# Patient Record
Sex: MALE | Race: WHITE
[De-identification: names, ages, dates, MRNs, and addresses within clinical notes are randomized per-mention and may not be internally consistent; named-entity substitution may affect disease eponyms.]

---
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
## 2021-02-23 ENCOUNTER — HOSPITAL ENCOUNTER (EMERGENCY)
Dept: HOSPITAL 53 - M ED | Age: 21
Discharge: HOME | End: 2021-02-23
Payer: COMMERCIAL

## 2021-02-23 VITALS — DIASTOLIC BLOOD PRESSURE: 65 MMHG | SYSTOLIC BLOOD PRESSURE: 138 MMHG

## 2021-02-23 VITALS — WEIGHT: 169.76 LBS | BODY MASS INDEX: 21.79 KG/M2 | HEIGHT: 74 IN

## 2021-02-23 DIAGNOSIS — F17.200: ICD-10-CM

## 2021-02-23 DIAGNOSIS — B34.8: Primary | ICD-10-CM

## 2021-02-23 LAB
BASOPHILS # BLD AUTO: 0 10^3/UL (ref 0–0.2)
BASOPHILS NFR BLD AUTO: 0.4 % (ref 0–1)
BUN SERPL-MCNC: 9 MG/DL (ref 7–18)
CALCIUM SERPL-MCNC: 9.6 MG/DL (ref 8.5–10.1)
CHLORIDE SERPL-SCNC: 104 MEQ/L (ref 98–107)
CO2 SERPL-SCNC: 28 MEQ/L (ref 21–32)
CREAT SERPL-MCNC: 1.01 MG/DL (ref 0.7–1.3)
EOSINOPHIL # BLD AUTO: 0.1 10^3/UL (ref 0–0.5)
EOSINOPHIL NFR BLD AUTO: 1.2 % (ref 0–3)
GLUCOSE SERPL-MCNC: 85 MG/DL (ref 70–100)
HCT VFR BLD AUTO: 45.7 % (ref 42–52)
HGB BLD-MCNC: 15.1 G/DL (ref 13.5–17.5)
LYMPHOCYTES # BLD AUTO: 1.9 10^3/UL (ref 1.5–5)
LYMPHOCYTES NFR BLD AUTO: 22.2 % (ref 24–44)
MCH RBC QN AUTO: 29.2 PG (ref 27–33)
MCHC RBC AUTO-ENTMCNC: 33 G/DL (ref 32–36.5)
MCV RBC AUTO: 88.4 FL (ref 80–96)
MONOCYTES # BLD AUTO: 0.9 10^3/UL (ref 0–0.8)
MONOCYTES NFR BLD AUTO: 10.4 % (ref 2–8)
NEUTROPHILS # BLD AUTO: 5.5 10^3/UL (ref 1.5–8.5)
NEUTROPHILS NFR BLD AUTO: 65.7 % (ref 36–66)
PLATELET # BLD AUTO: 277 10^3/UL (ref 150–450)
POTASSIUM SERPL-SCNC: 3.8 MEQ/L (ref 3.5–5.1)
RBC # BLD AUTO: 5.17 10^6/UL (ref 4.3–6.1)
SODIUM SERPL-SCNC: 140 MEQ/L (ref 136–145)
WBC # BLD AUTO: 8.4 10^3/UL (ref 4–10)

## 2021-02-23 NOTE — REPVR
PROCEDURE INFORMATION: 

Exam: XR Chest, 2 Views 

Exam date and time: 2/23/2021 9:24 PM 

Age: 20 years old 

Clinical indication: Cough and dyspnea; Additional info: Dyspnea/cough 



TECHNIQUE: 

Imaging protocol: XR of the chest 

Views: 2 views. 



COMPARISON: 

No relevant prior studies available. 



FINDINGS: 

Lungs: Unremarkable. No consolidation. 

Pleural spaces: Unremarkable. No pleural effusion. No pneumothorax. 

Heart/Mediastinum: Unremarkable. No cardiomegaly. 

Bones/joints: Unremarkable. 



IMPRESSION: 

No acute findings. 



Electronically signed by: Juliocesar Gaspar On 02/23/2021  22:07:04 PM

## 2021-02-23 NOTE — CCD
Summarization Of Episode

                             Created on: 2021



Troy Barrow

External Reference #: [id number]

: 2000

Sex: Male



Demographics





                          Address                   [address]

[place]  [address]

 

                          Home Phone                [phone]

 

                          Preferred Language        English

 

                          Marital Status            Single

 

                          Adventist Affiliation     [place]

 

                          Race                      White

 

                          Ethnic Group              Not  or 





Author





                          Author                    [facility]

 

                          Organization              [facility]

 

                          Address                   Unknown

 

                          Phone                     Unavailable







Support





                Name            Relationship    Address         Phone

 

                    [facility]             Next Of Kin         [address]

[place]

[place]  [address]                    Unavailable

 

                    KENDRA MONTENEGRO    Next Of Kin         [place]

YOLANDE MONTANEZ                             [phone]



                                  



Re-disclosure Warning

          The records that you are about to access may contain information from 
federally-assisted alcohol or drug abuse programs. If such information is 
present, then the following federally mandated warning applies: This information
has been disclosed to you from records protected by federal confidentiality 
rules (42 CFR part 2). The federal rules prohibit you from making any further 
disclosure of this information unless further disclosure is expressly permitted 
by the written consent of the person to whom it pertains or as otherwise 
permitted by 42 CFR part 2. A general authorization for the release of medical 
or other information is NOT sufficient for this purpose. The Federal rules 
restrict any use of the information to criminally investigate or prosecute any 
alcohol or drug abuse patient.The records that you are about to access may 
contain highly sensitive health information, the redisclosure of which is 
protected by Article 27-F of the [facility] Public Health law. If you 
continue you may have access to information: Regarding HIV / AIDS; Provided by 
facilities licensed or operated by the [facility] Office of Mental Health; 
or Provided by the [facility] Office for People With Developmental 
Disabilities. If such information is present, then the following [place] 
mandated warning applies: This information has been disclosed to you from 
confidential records which are protected by state law. State law prohibits you 
from making any further disclosure of this information without the specific 
written consent of the person to whom it pertains, or as otherwise permitted by 
law. Any unauthorized further disclosure in violation of state law may result in
a fine or longterm sentence or both. A general authorization for the release of 
medical or other information is NOT sufficient authorization for further disc
losure.                                                          



Insurance Providers

          



             Payer name   Policy type / Coverage type Policy ID    Covered party

 ID Covered 

party's relationship to [place] Policy [place]             Plan Information

 

          [facility] [employer]           [id number]                        

     [id number]

## 2021-03-01 ENCOUNTER — HOSPITAL ENCOUNTER (INPATIENT)
Dept: HOSPITAL 53 - M ED | Age: 21
LOS: 8 days | Discharge: HOME | DRG: 881 | End: 2021-03-09
Payer: COMMERCIAL

## 2021-03-01 VITALS — WEIGHT: 162.04 LBS | BODY MASS INDEX: 21.48 KG/M2 | HEIGHT: 73 IN

## 2021-03-01 DIAGNOSIS — F43.21: Primary | ICD-10-CM

## 2021-03-01 DIAGNOSIS — Z91.19: ICD-10-CM

## 2021-03-01 DIAGNOSIS — F60.89: ICD-10-CM

## 2021-03-01 DIAGNOSIS — Z81.8: ICD-10-CM

## 2021-03-01 LAB
ALBUMIN SERPL BCG-MCNC: 4.4 GM/DL (ref 3.2–5.2)
ALT SERPL W P-5'-P-CCNC: 23 U/L (ref 12–78)
AMPHETAMINES UR QL SCN: NEGATIVE
APAP SERPL-MCNC: < 2 UG/ML (ref 10–30)
BARBITURATES UR QL SCN: NEGATIVE
BENZODIAZ UR QL SCN: NEGATIVE
BILIRUB CONJ SERPL-MCNC: < 0.1 MG/DL (ref 0–0.2)
BILIRUB SERPL-MCNC: 0.3 MG/DL (ref 0.2–1)
BUN SERPL-MCNC: 9 MG/DL (ref 7–18)
BZE UR QL SCN: NEGATIVE
CALCIUM SERPL-MCNC: 9 MG/DL (ref 8.5–10.1)
CANNABINOIDS UR QL SCN: NEGATIVE
CHLORIDE SERPL-SCNC: 107 MEQ/L (ref 98–107)
CO2 SERPL-SCNC: 29 MEQ/L (ref 21–32)
CREAT SERPL-MCNC: 0.43 MG/DL (ref 0.7–1.3)
ETHANOL SERPL-MCNC: 0 % (ref 0–0.01)
GLUCOSE SERPL-MCNC: 76 MG/DL (ref 70–100)
HCT VFR BLD AUTO: 41.3 % (ref 42–52)
HGB BLD-MCNC: 13.4 G/DL (ref 13.5–17.5)
MCH RBC QN AUTO: 28.6 PG (ref 27–33)
MCHC RBC AUTO-ENTMCNC: 32.4 G/DL (ref 32–36.5)
MCV RBC AUTO: 88.1 FL (ref 80–96)
METHADONE UR QL SCN: NEGATIVE
OPIATES UR QL SCN: NEGATIVE
PCP UR QL SCN: NEGATIVE
PLATELET # BLD AUTO: 259 10^3/UL (ref 150–450)
POTASSIUM SERPL-SCNC: 4 MEQ/L (ref 3.5–5.1)
PROT SERPL-MCNC: 7.7 GM/DL (ref 6.4–8.2)
RBC # BLD AUTO: 4.69 10^6/UL (ref 4.3–6.1)
RSV RNA NPH QL NAA+PROBE: NEGATIVE
SALICYLATES SERPL-MCNC: < 1.7 MG/DL (ref 5–30)
SODIUM SERPL-SCNC: 142 MEQ/L (ref 136–145)
TSH SERPL DL<=0.005 MIU/L-ACNC: 1.3 UIU/ML (ref 0.46–3.98)
WBC # BLD AUTO: 7.9 10^3/UL (ref 4–10)

## 2021-03-02 VITALS — SYSTOLIC BLOOD PRESSURE: 140 MMHG | DIASTOLIC BLOOD PRESSURE: 80 MMHG

## 2021-03-03 VITALS — DIASTOLIC BLOOD PRESSURE: 76 MMHG | SYSTOLIC BLOOD PRESSURE: 135 MMHG

## 2021-03-03 VITALS — SYSTOLIC BLOOD PRESSURE: 122 MMHG | DIASTOLIC BLOOD PRESSURE: 58 MMHG

## 2021-03-03 RX ADMIN — ACETAMINOPHEN PRN MG: 325 TABLET ORAL at 20:22

## 2021-03-03 NOTE — MHHPEPDOC
General


Date Of Admission:  Mar 3, 2021


Legal Status:  9.39


Chief Complaint


"20-year-old male who has posted videos and tick tok stating that a gun in my 

hand, would anyone stop me. He has been hospitalized in the past. He has been 

diagnosed as having a borderline personality like to be discharged from the 

 discharge planner talked to  and told us that patient has poor 

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 

a couple months. He states, "I don't know why I'm here." Patient was 

hospitalized in [place] for 2 weeks for suicidal ideation, denies that this is

true. Patient states he was there because his grandmother told him to kill 

himself. He was referred to us from [facility]. His safety worker was notified 

about his video. He has had poor sleep for a couple of weeks he has had a 

history of depression and anxiety in the emergency room. He was seen as "making 

random elaborate statements being excessive and contradictory. Marco Zavala 

who have the patient does not know is the  who works with the 

psychologist. It was reported concerning having a knife at his neck. The patient

denies. The patient states, "I need to get out of the  high school 

education. He has been employed as a  and worked with [employer]. He is 

from [place]. He left [place] in March 2018. His mother and father are still 

alive. He states he has 2 uncles with PTSD. He has 4 brothers. He denies drug 

use, legal problems, alcohol problems or history of abuse. He denies mood 

swings, although recent some depression. He denies suicide attempts. Denies 

suicidal statements. He denies homicidal statements. He states he is engaged to 

a woman named Nguyen from [facility] [address] [facility]. He met her in [facility] a year 

ago. Denies auditory hallucinations, visual hallucinations, but he is mildly 

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 He denies obsessions, 

compulsions and phobias. He states this other soldier, Araceli would hurt him and 

that Araceli yells at him..





History of Present Illness


HISTORY OF THE PRESENT ILLNESS: Patient is a 20 -year-old , male, who 

made tik tock videos with suicidal ideation. See history chief complaint above.





Psychiatric Review of Systems


Depression (2 or more weeks):  denies


[place]ana (4 or more days of):  denies


Psychosis:  denies


Anxiety:  denies


Anxiety/ 6 months or more of:  restlessness, keyed up





Past Psychiatric History


Previous Psychiatric Diagnosis: 2 week hospitalization in [place] for suicidal

ideation. Patient denies that his true.


Previous Psychiatric Admissions: As above.


Suicide Attempts: Ideation.


Psychiatric Follow-up: [facility].


Psychiatric medications:. No medication.





Past Medical History


Medical Problems


No medical disorders


Head Injury:  No


Seizures:  No


Hospitalizations:  No


Surgeries:  No





Family Medical/Psychiatric HX


Psychiatric Disorders:  No


Addiction:  No


Suicide Attemps/Completions:  No





Addiction History


denies





Social History


Childhood: Noncontributory. As of now.


Abuse/Trauma: Noncontributory.


Current Living Situation: [facility]


Education: High school.


Employment: [employer].


Social Support: Family in [place].


Legal:. None.


Marital: Single.





Mental Status Examination


General Appearance:  unkempt


Demeanor:  average


Eye Contact:  average


Activity:  average


Behavior:  cooperative


Speech:  clear


Mood:  euthymic


Mood


fair


Affect:  full


Thought Process:  other


Thought Content (Delusions):  none reported


Thought Content (Other):  appears paranoid


Thought Content (Aggressive):  none reported


Perception (Hallucinations):  none reported


Perception (Other):  none reported


Cognition (Impairment of):  none reported


Cognition(Intelligence Est.):  average


Oriented:  Awake, Alert, Oriented times three


Insight:  poor


Judgment:  Poor


Psychosis:  Denies





Diagnoses


Depression, personality disorder





A-FIB/CHADSVASC


A-FIB History


Current/History of A-Fib/PAF?:  No


Current PO Anticoag Therapy:  No





Age/Risk Factor Scoring


CHADSVASC:  








CHADSVASC Response (Comments) Value


 


Age Risk Factor Age < 65 years old 0


 


Gender Risk Factor Male 0


 


Hx of CHF No 0


 


Hx of HTN No 0


 


Hx of Stroke/TIA/or VTE No 0


 


Hx of Diabetes No 0


 


Hx of Vascular Disease No 0


 


Total  0











Treatment


Treatment ordered:  NONE





Initial Treatment Plan


1. Patient was admitted on a [9.39] status.


2. Complete history was obtained.


3. With patients permission, family will be contacted and database will be 

expanded. 


4. Patients medication regimen will be reviewed and changed accordingly. 


5. Patient will be provided with protected environment. 


6. Patient will be treated with individual, group, and milieu therapies. 


7. Patient will receive supportive psych-education.


8. Discharge planning will commence immediately.


9. Outpatient follow-up treatment will be strongly recommended.


10. The initial treatment plan will focus initially on:


* Depression.


* Risk for suicide.





ESTIMATED LENGTH OF STAY: - DAYS.





TIME SPENT COUNSELING AND COORDINATING INITIAL CARE:  minutes.





Vital Signs





Vital Signs








  Date Time  Temp Pulse Resp B/P (MAP) Pulse Ox O2 Delivery O2 Flow Rate FiO2


 


3/3/21 06:21 98.8 74 16 122/58 (79) 99 Room Air  











Medications


No Active Prescriptions or Reported Meds





Allergies


Coded Allergies:  


     No Known Allergies (Unverified , 2/23/21)











PERFECTO MILLER MD             Mar 3, 2021 15:00

## 2021-03-03 NOTE — ECGEPIP
[facility] - ED

                                       

                                       Test Date:    2021

Pat Name:     Troy Barrow             Department:   

Patient ID:   [id number]                 Room:         -

Gender:       Male                     Technician:   JIM

:          2000               Requested By: NAEEM RAYA

Order Number: [id number]     Reading MD:   Domenica Richter

                                 Measurements

Intervals                              Axis          

Rate:         56                       P:            45

MN:           180                      QRS:          82

QRSD:         98                       T:            55

QT:           420                                    

QTc:          405                                    

                           Interpretive Statements

Sinus bradycardia

early repolarization

no prior

Electronically Signed on 3-3-2021 14:15:38 EST by Domenica Richter

## 2021-03-03 NOTE — HPEPDOC
General


Date of Admission


Mar 2, 2021 at 14:50


Date of Service:  Mar 3, 2021


Attending Physician:  ZOILA AVILA MD


Chief Complaint


The patient is a 20-year-old male admitted with a reason for visit of Major 

Depressive Disorder.


Source:  Patient, RN notes reviewed


Exam Limitations:  No limitations





History of Present Illness


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 

who was referred to the ED by his psychologist at [facility](Derrick Strickland) after he

posted numerous tic-tok videos expressing SI and asking his followers "if I held

a gun to my head, would anyone stop me?" Given his history of prior 

hospitalization in the past following a suicidal gesture Dr. Strickland referred 

him for evaluation. On initial evaluation, he was hemodynamically stable, 

afebrile, breathing comfortably on room air, alert and oriented x 3 and 

expressing that he did nothing wrong or to warrant admission to mental health. 

He did express later that being in the  has been detrimental to his 

health, especially mental health and he has poor sleep, poor PO, poor anger 

management skills and is making him "snap." He otherwise denies a history of 

chronic illnesses or medications. He reports a recent URI for which he presented

for a viral syndrome and he was covid-19 negative. Internal medicine was 

consulted for medical evaluation.





Home Medications


No Active Prescriptions or Reported Meds





Allergies


Coded Allergies:  


     No Known Allergies (Unverified , 2/23/21)





Past Medical History


Medical History


Chart mention of bipolar personality disorder


History of suicidal gestures


Surgical History


None





Family History


Significant Family History:  No pertinent family hx





Social History


* Smoker:  Denies


Alcohol:  Denies


Drugs:  denies


Recent Travel/Sick Contacts:  Denies: Recent travel, Recent sick contacts


Active  pursuing medical discharge





A-FIB/CHADSVASC


A-FIB History


Current/History of A-Fib/PAF?:  No


Current PO Anticoag Therapy:  No





Age/Risk Factor Scoring


CHADSVASC:  








CHADSVASC Response (Comments) Value


 


Age Risk Factor Age < 65 years old 0


 


Gender Risk Factor Male 0


 


Hx of CHF No 0


 


Hx of HTN No 0


 


Hx of Stroke/TIA/or VTE No 0


 


Hx of Diabetes No 0


 


Hx of Vascular Disease No 0


 


Total  0











Treatment


Treatment ordered:  NONE


Reason Anticoagulant not given:  Not indicated/[id number]





Review of Systems


Constitutional:  Denies: Chills, Fever, Night Sweats


Eyes:  Denies: Pain, Vision change


ENT:  Denies: Head Aches, Ear Pain, Dysphagia


Skin:  Denies: Rash, Lesions, Breakdown


Pulmonary:  Denies: Dyspnea, Cough


Cardiovascular:  Denies: Chest Pain, Palpitations, Orthopnea, Paroxysmal Noc. 

Dyspnea, Lt Headedness


Gastrointestinal:  Denies: Nausea, Vomiting, Abdominal Pain, Diarrhea


Genitourinary:  Denies: Dysuria, Frequency, Incontinence, Retention


Hematologic:  Denies: Bruising, Bleeding Excessively


Endocrine:  Denies: Polydipsia, Polyphagia, Polyuria, Heat Intolerance, Cold 

Intolerance, Other Endocrine Sx


Musculoskeletal:  Reports: Back Pain (sometimes); 


   Denies: Neck Pain, Joint Pain, Muscle Pain, Spasms


Neurological:  Denies: Weakness, Numbness, Change in speech, Confusion


Psych:  Reports: Mood Normal, Depression, Anger (sometimes, reports short temper

since joining the ); 


   Denies: Memory Issues, Thoughts of Self Harm





Physical Examination


General Exam:  Positive: Alert, No Acute Distress


Eye Exam:  Negative: PERRLA, Conjunctiva & lids normal, EOMI, Sclera icteric, 

Ptosis, Other Eye Symptoms


ENT Exam:  Positive: Atraumatic, Mucous membr. moist/pink, Pharynx Normal


Neck Exam:  Positive: Supple; 


   Negative: JVD, thyromegaly


Chest Exam:  Positive: Clear to auscultation, Normal air movement


Heart Exam:  Positive: Rate Normal, Regular Rhythm, Normal S1, Normal S2; 


   Negative: Murmurs, Rubs


Telemetry:  Positive: No significant arrhythmia


Abdomen Exam:  Positive: Normal bowel sounds, Soft; 


   Negative: Tenderness, Hepatospenomegaly


Extremity Exam:  Positive: Normal pulses; 


   Negative: Clubbing, Cyanosis, Edema


Skin Exam:  Positive: Nl turgor and temperature; 


   Negative: Breakdown, Lesion


Neuro Exam:  Positive: Normal Gait, Normal Speech, Cranial Nerves 3-12 NL, 

Reflexes 2+


Psych Exam:  Positive: Mental status NL, Mood NL, Oriented x 3





Vital Signs





Vital Signs








  Date Time  Temp Pulse Resp B/P (MAP) Pulse Ox O2 Delivery O2 Flow Rate FiO2


 


3/3/21 06:21 98.8 74 16 122/58 (79) 99 Room Air  











 Assessment/Plan


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 

for a suicidal gesture on social media.





Depression with suicidal gesture on social media i/s/o of stress


-per primary psychiatry team





He otherwise has no other noted medical problems at this time. Internal medicine

will sign off at this time.





Plan / VTE


VTE Prophylaxis Ordered?:  No


VTE Exclusion Mechanical Proph:  Low Risk for VTE


VTE Exclusion Pharmacological:  At Low Risk for VTE











ZOILA AVILA MD    Mar 3, 2021 14:30

## 2021-03-04 VITALS — DIASTOLIC BLOOD PRESSURE: 64 MMHG | SYSTOLIC BLOOD PRESSURE: 112 MMHG

## 2021-03-04 VITALS — SYSTOLIC BLOOD PRESSURE: 117 MMHG | DIASTOLIC BLOOD PRESSURE: 56 MMHG

## 2021-03-04 NOTE — MHIPNPDOC
[facility] Progress Note


Progress Note


DATE OF SERVICE: 3/4/21





HISTORY: Troy discussed his various tic tok videos and stated that they were 

misinterpreted because of the music that was playing. his recollections and port

rayal of events do not in any way match the reported information





VITAL SIGNS: See below.





NEW TEST RESULTS: None.





CURRENT MEDICATIONS: See below.





MENTAL STATUS EXAMINATION:


Patient is a 20-year old male, who is in good mood.


Speech: Is. Normal.


Language skills are gross disturbance.


Thought processes including:. No gross disturbance.


Thought content: No gross disturbance. Abstract reasoning, and computation:. No 

gross disturbance. Description of associations:, No loose association.


Description of abnormal or psychotic thoughts:. No psychotic thought.


Judgment:, Poor.


Insight:, Limited.


Orientation: 3.


Recent and remote memory: Intact, but patient confabulates.


Attention span and concentration: Intact.


Language: No gross disturbance.


Fund of knowledge:, Reasonable.


Mood: Good. Affect: Bright.





DIAGNOSES:


1. Depression.


2., Personality disorder.


3. None.


 


ASSESSMENT: As above





MANAGEMENT PLAN: Plan to seek more information.





TIME SPENT: 35 minutes.





Vital Signs





Vital Signs








  Date Time  Temp Pulse Resp B/P (MAP) Pulse Ox O2 Delivery O2 Flow Rate FiO2


 


3/4/21 17:18 98.6 71 18 112/64 (80)    


 


3/4/21 06:29     99 Room Air  











Current Medications





Current Medications








 Medications


  (Trade)  Dose


 Ordered  Sig/Torito


 Route


 PRN Reason  Start Time


 Stop Time Status Last Admin


Dose Admin


 


 Acetaminophen


  (Tylenol Tab)  650 mg  Q6HP  PRN


 PO


 HEADACHE or DISCOMFORT  3/2/21 14:50


    3/3/21 20:22





 


 Al Hydrox/Mg


 Hydrox/Simethicone


  (Mylanta)  30 ml  Q4HP  PRN


 PO


 HEARTBURN/INDIGESTION  3/2/21 14:50


     





 


 Home Med


  (Med Rec


 Complete!)    ASDIRECTED


 XX


   3/2/21 13:40


 3/2/21 13:45 DC  





 


 Magnesium


 Hydroxide


  (Milk Of


 Magnesia)  30 ml  DAILYPRN  PRN


 PO


 CONSTIPATION  3/2/21 14:50


     





 


 Trazodone HCl


  (Desyrel)  50 mg  QHSP  PRN


 PO


 INSOMNIA  3/2/21 14:50


    3/3/21 20:22














Allergies


Coded Allergies:  


     No Known Allergies (Unverified , 2/23/21)











PERFECTO MILLER MD             Mar 4, 2021 17:44

## 2021-03-05 VITALS — SYSTOLIC BLOOD PRESSURE: 122 MMHG | DIASTOLIC BLOOD PRESSURE: 62 MMHG

## 2021-03-05 VITALS — DIASTOLIC BLOOD PRESSURE: 85 MMHG | SYSTOLIC BLOOD PRESSURE: 138 MMHG

## 2021-03-05 NOTE — MHIPNPDOC
[facility] Progress Note


Progress Note


DATE OF SERVICE: 3/5/21





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





VITAL SIGNS: See below.





NEW TEST RESULTS: None.





CURRENT MEDICATIONS: See below.





MENTAL STATUS EXAMINATION:


Patient is a. 20-year old male, who is. Apparently noncompliant with treatment 

at [place].


Speech: Is slightly rapid.


Language skills are disturbance.


Thought processes including: Need to apologize. According to patient.


Thought content:, Repetitive statements of his need to apologize. Abstract 

reasoning, and computation: Present. Description of associations:. No loose 

association.


Description of abnormal or psychotic thoughts:. No psychotic thought.


Judgment:, Poor.


Insight:, Poor.


Orientation: 3.


Recent and remote memory: Intact.


Attention span and concentration:. Poor.


Language:. No gross disturbance.


Fund of knowledge:. Reasonable.


Mood: Good, if not a bit elevated. Affect: Perhaps a bit too bright.





DIAGNOSES:


1. Depression by history.


2.. Rule out bipolar disorder.


3., Noncompliance with treatment.


 


ASSESSMENT: As above should be observed for more serious mood disorder





MANAGEMENT PLAN[place].





TIME SPENT: 35 minutes.





Vital Signs





Vital Signs








  Date Time  Temp Pulse Resp B/P (MAP) Pulse Ox O2 Delivery O2 Flow Rate FiO2


 


3/5/21 16:41 98.4 75 16 138/85 (102) 97 Room Air  











Current Medications





Current Medications








 Medications


  (Trade)  Dose


 Ordered  Sig/Torito


 Route


 PRN Reason  Start Time


 Stop Time Status Last Admin


Dose Admin


 


 Acetaminophen


  (Tylenol Tab)  650 mg  Q6HP  PRN


 PO


 HEADACHE or DISCOMFORT  3/2/21 14:50


    3/3/21 20:22





 


 Al Hydrox/Mg


 Hydrox/Simethicone


  (Mylanta)  30 ml  Q4HP  PRN


 PO


 HEARTBURN/INDIGESTION  3/2/21 14:50


     





 


 Home Med


  (Med Rec


 Complete!)    ASDIRECTED


 XX


   3/2/21 13:40


 3/2/21 13:45 DC  





 


 Magnesium


 Hydroxide


  (Milk Of


 Magnesia)  30 ml  DAILYPRN  PRN


 PO


 CONSTIPATION  3/2/21 14:50


     





 


 Trazodone HCl


  (Desyrel)  50 mg  QHSP  PRN


 PO


 INSOMNIA  3/2/21 14:50


    3/4/21 20:05














Allergies


Coded Allergies:  


     No Known Allergies (Unverified , 2/23/21)











PERFECTO MILLER MD             Mar 5, 2021 17:08

## 2021-03-06 VITALS — DIASTOLIC BLOOD PRESSURE: 53 MMHG | SYSTOLIC BLOOD PRESSURE: 109 MMHG

## 2021-03-06 VITALS — DIASTOLIC BLOOD PRESSURE: 70 MMHG | SYSTOLIC BLOOD PRESSURE: 116 MMHG

## 2021-03-06 NOTE — MHIPNPDOC
[facility] Progress Note


Progress Note


DATE OF SERVICE: 3/6/21





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





VITAL SIGNS: See below.





NEW TEST RESULTS: None.





CURRENT MEDICATIONS: See below.





MENTAL STATUS EXAMINATION:


Patient is a. 20-year old male, who is. Apparently noncompliant with treatment 

at [place].


Speech: Is normal in r/t/v, spontaneous and fluent


Language skills are fair


Thought processes including: linear though not necessarily coherent, he 

perseverates about him saving 32 kids who were thinking about killing themselves


Thought content:, Repetitive statements of his need to apologize. 


Abstract reasoning, and computation: fair


Description of associations:. No loose association.


Description of abnormal or psychotic thoughts: Denies thought delusions, denies 

TAV hallucinations, he is not responding to internal stimuli


Judgment:, Poor.


Insight: Poor.


Orientation: 3.


Recent and remote memory: Intact.


Attention span and concentration: Poor.


Language:. No gross disturbance.


Fund of knowledge:. Reasonable.


Mood: Good   Affect: Congruent with mood, full, reactive





DIAGNOSES:


1. Depression by history.


2. Rule out bipolar disorder.


3. Noncompliance with treatment.


 


ASSESSMENT: I agree with Dr. Hodgson, I think the possibility of Bipolar disorder

should be considered. 





MANAGEMENT PLAN: [place] to base.





TIME SPENT: 35 minutes.





Vital Signs





Vital Signs








  Date Time  Temp Pulse Resp B/P (MAP) Pulse Ox O2 Delivery O2 Flow Rate FiO2


 


3/6/21 16:17 98.2 60 18 116/70 (85) 99 Room Air  











Current Medications





Current Medications








 Medications


  (Trade)  Dose


 Ordered  Sig/Torito


 Route


 PRN Reason  Start Time


 Stop Time Status Last Admin


Dose Admin


 


 Acetaminophen


  (Tylenol Tab)  650 mg  Q6HP  PRN


 PO


 HEADACHE or DISCOMFORT  3/2/21 14:50


    3/3/21 20:22





 


 Al Hydrox/Mg


 Hydrox/Simethicone


  (Mylanta)  30 ml  Q4HP  PRN


 PO


 HEARTBURN/INDIGESTION  3/2/21 14:50


     





 


 Home Med


  (Med Rec


 Complete!)    ASDIRECTED


 XX


   3/2/21 13:40


 3/2/21 13:45 DC  





 


 Magnesium


 Hydroxide


  (Milk Of


 Magnesia)  30 ml  DAILYPRN  PRN


 PO


 CONSTIPATION  3/2/21 14:50


     





 


 Trazodone HCl


  (Desyrel)  50 mg  QHSP  PRN


 PO


 INSOMNIA  3/2/21 14:50


    3/4/21 20:05














Allergies


Coded Allergies:  


     No Known Allergies (Unverified , 2/23/21)











JL CANCINO MD              Mar 6, 2021 17:31

## 2021-03-07 VITALS — SYSTOLIC BLOOD PRESSURE: 117 MMHG | DIASTOLIC BLOOD PRESSURE: 55 MMHG

## 2021-03-07 VITALS — SYSTOLIC BLOOD PRESSURE: 140 MMHG | DIASTOLIC BLOOD PRESSURE: 89 MMHG

## 2021-03-07 RX ADMIN — BISACODYL SCH MG: 5 TABLET, COATED ORAL at 21:59

## 2021-03-08 VITALS — DIASTOLIC BLOOD PRESSURE: 85 MMHG | SYSTOLIC BLOOD PRESSURE: 132 MMHG

## 2021-03-08 VITALS — DIASTOLIC BLOOD PRESSURE: 83 MMHG | SYSTOLIC BLOOD PRESSURE: 136 MMHG

## 2021-03-08 RX ADMIN — BISACODYL SCH MG: 5 TABLET, COATED ORAL at 20:32

## 2021-03-08 RX ADMIN — ACETAMINOPHEN PRN MG: 325 TABLET ORAL at 21:31

## 2021-03-08 RX ADMIN — BISACODYL SCH MG: 5 TABLET, COATED ORAL at 08:32

## 2021-03-08 NOTE — MHIPNPDOC
[facility] Progress Note


Progress Note


DATE OF SERVICE: 3/8/21





HISTORY: 20-year-old male made suicidal statements and tic andressa. Somewhat 

personality disordered and even slightly questionably hypomanic.





VITAL SIGNS: See below.





NEW TEST RESULTS:, None.





CURRENT MEDICATIONS: See below.





MENTAL STATUS EXAMINATION:


Patient is a 20-year old male, who is's stating. He is apologizing for his 

behavior.


Speech: Is no gross disturbance.


Language skills are. No gross disturbance.


Thought processes including:. No gross disturbance.


Thought content: Apologetic. Abstract reasoning, and computation: Intact. 

Description of associations:. No loose association.


Description of abnormal or psychotic thoughts:. No psychotic thought.


Judgment: Poor.


Insight:, Limited.


Orientation: 3.


Recent and remote memory: Intact.


Attention span and concentration:. No gross disturbance.


Language:. No gross disturbance.


Fund of knowledge: Reasonable.


Mood: Good. Affect: Bright.





DIAGNOSES:


1. Depression.


2., Personality disorder.


3., Rule out hypomania.


 


ASSESSMENT: As above





MANAGEMENT PLAN:. Discharge tomorrow.





TIME SPENT:, 35 minutes.





Vital Signs





Vital Signs








  Date Time  Temp Pulse Resp B/P (MAP) Pulse Ox O2 Delivery O2 Flow Rate FiO2


 


3/8/21 06:54 97.9 64 16 136/83 (100) 99 Room Air  











Current Medications





Current Medications








 Medications


  (Trade)  Dose


 Ordered  Sig/Torito


 Route


 PRN Reason  Start Time


 Stop Time Status Last Admin


Dose Admin


 


 Acetaminophen


  (Tylenol Tab)  650 mg  Q6HP  PRN


 PO


 HEADACHE or DISCOMFORT  3/2/21 14:50


    3/3/21 20:22





 


 Al Hydrox/Mg


 Hydrox/Simethicone


  (Mylanta)  30 ml  Q4HP  PRN


 PO


 HEARTBURN/INDIGESTION  3/2/21 14:50


     





 


 Bisacodyl


  (Dulcolax Tab)  10 mg  BID


 PO


   3/7/21 21:00


 3/9/21 22:00  3/7/21 21:59





 


 Bisacodyl


  (Dulcolax Tab)  10 mg  BIDP  PRN


 PO


 CONSTIPATION  3/10/21 00:00


     





 


 Home Med


  (Med Rec


 Complete!)    ASDIRECTED


 XX


   3/2/21 13:40


 3/2/21 13:45 DC  





 


 Magnesium


 Hydroxide


  (Milk Of


 Magnesia)  30 ml  DAILYPRN  PRN


 PO


 CONSTIPATION  3/2/21 14:50


     





 


 Trazodone HCl


  (Desyrel)  50 mg  QHSP  PRN


 PO


 INSOMNIA  3/2/21 14:50


    3/7/21 21:59














Allergies


Coded Allergies:  


     No Known Allergies (Unverified , 2/23/21)











PERFECTO MILLER MD             Mar 8, 2021 14:58

## 2021-03-09 VITALS — DIASTOLIC BLOOD PRESSURE: 54 MMHG | SYSTOLIC BLOOD PRESSURE: 112 MMHG

## 2021-03-09 RX ADMIN — BISACODYL SCH MG: 5 TABLET, COATED ORAL at 08:51

## 2021-03-09 NOTE — MHDSPDOC
[facility] Discharge Summary


Discharge Summary


DATE OF ADMISSION: Mar 2, 2021 at 14:50 


DATE OF DISCHARGE: March 9,2021





DISCHARGE DIAGNOSES:


1. Adjustment disorder with depressed mood.


2., Personality disorder, cluster.





REASON FOR ADMISSION: 


"20-year-old male who has posted videos and tick tok stating that a gun in my 

hand, would anyone stop me. He has been hospitalized in the past. He has been 

diagnosed as having a borderline personality [place]  and told us that patient has poor 

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 

a couple months. He states, "I don't know why I'm here." Patient was 

hospitalized in [place] for 2 weeks for suicidal ideation, denies that this is

true. Patient states he was there because his grandmother told him to kill h

imself. He was referred to us from [place]. His safety worker was notified 

about his video. He has had poor sleep for a couple of weeks he has had a 

history of depression and anxiety in the emergency room. He was seen as "making 

random elaborate statements being excessive and contradictory. Marco Zavala 

who have the patient does not know is the  who works with the 

psychologist. It was reported concerning having a knife at his neck. The patient

denies. The patient states, "I need to get out of the  high school 

education. He has been employed as a  and worked with [employer]. He is 

from [place]. He left [place] in March 2018. His mother and father are still 

alive. He states he has 2 uncles with PTSD. He has 4 brothers. He denies drug 

use, legal problems, alcohol problems or history of abuse. He denies mood 

swings, although recent some depression. He denies suicide attempts. Denies 

suicidal statements. He denies homicidal statements. He states he is engaged to 

a woman named Nguyen from [employer] [facility]. He met her in [facility] a year 

ago. Denies auditory hallucinations, visual hallucinations, but he is mildly 

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 He denies obsessions, 

compulsions and phobias. He states this other soldier, Araceli would hurt him and 

that Araceli yells at him..








CONSULTANTS INVOLVED: None





TREATMENT AND PROGRESS ON THE UNIT : Observation and discussion. Patient agreed 

about the drama and instigation of his behavior and agreed to apologize to 

everyone.





HOSPITAL COURSE: Unremarkable





DISCHARGE ASSESSMENT: His excessive, apologies, concern me about his 

personality, but for now, he will need follow-up and observation in an 

outpatient clinic





MENTAL STATUS EXAMINATION ON DISCHARGE: 


Patient is a 20-year old male, who is admitted following posting suicidal 

videos.


Speech is. No gross disturbance.


Language skills are intact.


Thought processes including: Excessive apology. 


Thought content: As above.


Abstract reasoning, and computation:, Limited.


Description of associations:, No loose associations.


Description of abnormal or psychotic thoughts:. No psychotic thought.


Judgment: Poor.


Insight:, Poor.


Orientation to 3.


Recent and remote memory: Intact.


Attention span and concentration: Intact.


Language:. No gross disturbance.


Fund of knowledge: Reasonable.


Mood: Good.


Affect:, Congruent.





MEDICATIONS ON DISCHARGE:


-Trazodone 50 mg for sleep.


-


- 





PLAN/FOLLOWUP ARRANGEMENTS: Per chain of command.





The amount of time spent in the coordination of care for this patient was 

approximately 35 minutes.





ETOH/Disorder Med Rx


ETOH/DRUG DISORDER RX:  N/A





Vital Signs/I&Os





Vital Signs








  Date Time  Temp Pulse Resp B/P (MAP) Pulse Ox O2 Delivery O2 Flow Rate FiO2


 


3/9/21 06:48 98.3 51 20 112/54 (73) 100 Room Air  











Medications


Scheduled PRN


Trazodone HCl (Trazodone HCl) 50 Mg Tablet, 50 MG PO QHSP PRN for INSOMNIA, #10





Allergies


Coded Allergies:  


     No Known Allergies (Unverified , 2/23/21)











PERFECTO MILLER MD             Mar 9, 2021 07:11

## 2021-05-25 ENCOUNTER — HOSPITAL ENCOUNTER (EMERGENCY)
Dept: HOSPITAL 53 - M ED | Age: 21
Discharge: HOME | End: 2021-05-25
Payer: COMMERCIAL

## 2021-05-25 VITALS — HEIGHT: 73 IN | WEIGHT: 160.34 LBS | BODY MASS INDEX: 21.25 KG/M2

## 2021-05-25 VITALS — SYSTOLIC BLOOD PRESSURE: 130 MMHG | DIASTOLIC BLOOD PRESSURE: 87 MMHG

## 2021-05-25 DIAGNOSIS — G89.29: ICD-10-CM

## 2021-05-25 DIAGNOSIS — M54.5: Primary | ICD-10-CM

## 2021-05-25 NOTE — REP
INDICATION:

remote trauma, low back pain since



COMPARISON:

None.



TECHNIQUE:

AP, lateral, bilateral oblique, and coned-down views of the lumbar spine.



FINDINGS:

Alignment and lordosis maintained.  Vertebral bodies are intact.  No acute

fracture/compression injury or subluxation.  No obvious spondylolysis or

spondylolisthesis..  Very minimal endplate sclerosis and disc space narrowing at L5-S1

should be correlated clinically.



IMPRESSION:

Mild disc space narrowing at L5-S1 suggested.





<Electronically signed by Viet Villatoro > 05/25/21 [id number]

## 2021-08-07 ENCOUNTER — HOSPITAL ENCOUNTER (EMERGENCY)
Dept: HOSPITAL 53 - M ED | Age: 21
Discharge: HOME | End: 2021-08-07
Payer: COMMERCIAL

## 2021-08-07 VITALS
SYSTOLIC BLOOD PRESSURE: 135 MMHG | WEIGHT: 165.35 LBS | HEIGHT: 73 IN | DIASTOLIC BLOOD PRESSURE: 79 MMHG | BODY MASS INDEX: 21.91 KG/M2

## 2021-08-07 DIAGNOSIS — R56.9: ICD-10-CM

## 2021-08-07 DIAGNOSIS — F43.10: ICD-10-CM

## 2021-08-07 DIAGNOSIS — K21.9: ICD-10-CM

## 2021-08-07 DIAGNOSIS — Z79.899: ICD-10-CM

## 2021-08-07 DIAGNOSIS — M54.9: ICD-10-CM

## 2021-08-07 DIAGNOSIS — B34.9: Primary | ICD-10-CM

## 2021-08-07 DIAGNOSIS — J45.909: ICD-10-CM

## 2021-08-07 LAB — RSV RNA NPH QL NAA+PROBE: NEGATIVE

## 2021-08-07 PROCEDURE — 99282 EMERGENCY DEPT VISIT SF MDM: CPT

## 2021-08-07 PROCEDURE — 87631 RESP VIRUS 3-5 TARGETS: CPT

## 2022-08-12 ENCOUNTER — HOSPITAL ENCOUNTER (OUTPATIENT)
Dept: HOSPITAL 95 - LAB | Age: 22
Discharge: HOME | End: 2022-08-12
Attending: PHYSICIAN ASSISTANT
Payer: COMMERCIAL

## 2022-08-12 DIAGNOSIS — R10.9: Primary | ICD-10-CM

## 2022-08-12 LAB
ALBUMIN SERPL BCP-MCNC: 4.6 G/DL (ref 3.4–5)
ALBUMIN/GLOB SERPL: 1.1 {RATIO} (ref 0.8–1.8)
ALT SERPL W P-5'-P-CCNC: 36 U/L (ref 12–78)
ANION GAP SERPL CALCULATED.4IONS-SCNC: 6 MMOL/L (ref 6–16)
AST SERPL W P-5'-P-CCNC: 24 U/L (ref 12–37)
BASOPHILS # BLD AUTO: 0.02 K/MM3 (ref 0–0.23)
BASOPHILS NFR BLD AUTO: 0 % (ref 0–2)
BILIRUB SERPL-MCNC: 0.7 MG/DL (ref 0.1–1)
BUN SERPL-MCNC: 15 MG/DL (ref 8–24)
CALCIUM SERPL-MCNC: 8.8 MG/DL (ref 8.5–10.1)
CHLORIDE SERPL-SCNC: 103 MMOL/L (ref 98–108)
CO2 SERPL-SCNC: 28 MMOL/L (ref 21–32)
CREAT SERPL-MCNC: 0.99 MG/DL (ref 0.6–1.2)
DEPRECATED RDW RBC AUTO: 36.9 FL (ref 35.1–46.3)
EOSINOPHIL # BLD AUTO: 0.17 K/MM3 (ref 0–0.68)
EOSINOPHIL NFR BLD AUTO: 1 % (ref 0–6)
ERYTHROCYTE [DISTWIDTH] IN BLOOD BY AUTOMATED COUNT: 12.1 % (ref 11.7–14.2)
GLOBULIN SER CALC-MCNC: 4.2 G/DL (ref 2.2–4)
GLUCOSE SERPL-MCNC: 91 MG/DL (ref 70–99)
HCT VFR BLD AUTO: 45.7 % (ref 37–53)
HGB BLD-MCNC: 15.6 G/DL (ref 13.5–17.5)
IMM GRANULOCYTES # BLD AUTO: 0.04 K/MM3 (ref 0–0.1)
IMM GRANULOCYTES NFR BLD AUTO: 0 % (ref 0–1)
LYMPHOCYTES # BLD AUTO: 1.58 K/MM3 (ref 0.84–5.2)
LYMPHOCYTES NFR BLD AUTO: 13 % (ref 21–46)
MCHC RBC AUTO-ENTMCNC: 34.1 G/DL (ref 31.5–36.5)
MCV RBC AUTO: 85 FL (ref 80–100)
MONOCYTES # BLD AUTO: 0.8 K/MM3 (ref 0.16–1.47)
MONOCYTES NFR BLD AUTO: 7 % (ref 4–13)
NEUTROPHILS # BLD AUTO: 9.54 K/MM3 (ref 1.96–9.15)
NEUTROPHILS NFR BLD AUTO: 79 % (ref 41–73)
NRBC # BLD AUTO: 0 K/MM3 (ref 0–0.02)
NRBC BLD AUTO-RTO: 0 /100 WBC (ref 0–0.2)
PLATELET # BLD AUTO: 286 K/MM3 (ref 150–400)
POTASSIUM SERPL-SCNC: 4.6 MMOL/L (ref 3.5–5.5)
PROT SERPL-MCNC: 8.8 G/DL (ref 6.4–8.2)
SODIUM SERPL-SCNC: 137 MMOL/L (ref 136–145)